# Patient Record
Sex: FEMALE | URBAN - METROPOLITAN AREA
[De-identification: names, ages, dates, MRNs, and addresses within clinical notes are randomized per-mention and may not be internally consistent; named-entity substitution may affect disease eponyms.]

---

## 2023-04-11 ENCOUNTER — OFFICE VISIT (OUTPATIENT)
Dept: URBAN - METROPOLITAN AREA CLINIC 17 | Facility: CLINIC | Age: 38
End: 2023-04-11
Payer: COMMERCIAL

## 2023-04-11 DIAGNOSIS — H04.123 DRY EYE SYNDROME OF BILATERAL LACRIMAL GLANDS: Primary | ICD-10-CM

## 2023-04-11 ASSESSMENT — INTRAOCULAR PRESSURE
OS: 13
OD: 13

## 2023-04-11 NOTE — IMPRESSION/PLAN
Impression: Dry eye syndrome of bilateral lacrimal glands: H04.123. Plan: Discussed diagnosis with patient. Recommend OTC artificial tears in OU for comfort, 3-4x's a day.  No signs of laser scars in retina OU on today's exam.

## 2023-05-19 ENCOUNTER — REFRACTIVE (OUTPATIENT)
Dept: URBAN - METROPOLITAN AREA CLINIC 37 | Facility: CLINIC | Age: 38
End: 2023-05-19

## 2023-05-19 DIAGNOSIS — H52.211 IRREGULAR ASTIGMATISM, RIGHT EYE: Primary | ICD-10-CM

## 2023-05-19 ASSESSMENT — KERATOMETRY
OS: 45.00
OD: 44.70

## 2023-05-19 ASSESSMENT — VISUAL ACUITY
OD: 20/20
OS: 20/20

## 2023-05-19 ASSESSMENT — INTRAOCULAR PRESSURE
OD: 17
OS: 18

## 2023-08-23 ENCOUNTER — OFFICE VISIT (OUTPATIENT)
Dept: URBAN - METROPOLITAN AREA CLINIC 17 | Facility: CLINIC | Age: 38
End: 2023-08-23
Payer: COMMERCIAL

## 2023-08-23 DIAGNOSIS — H52.223 REGULAR ASTIGMATISM, BILATERAL: Primary | ICD-10-CM

## 2023-08-23 DIAGNOSIS — H04.123 DRY EYE SYNDROME OF BILATERAL LACRIMAL GLANDS: ICD-10-CM

## 2023-08-23 DIAGNOSIS — H10.45 OTHER CHRONIC ALLERGIC CONJUNCTIVITIS: ICD-10-CM

## 2023-08-23 PROCEDURE — 92310 CONTACT LENS FITTING OU: CPT

## 2023-08-23 PROCEDURE — 92012 INTRM OPH EXAM EST PATIENT: CPT

## 2023-08-23 ASSESSMENT — VISUAL ACUITY
OS: 20/20
OD: 20/15

## 2023-08-23 ASSESSMENT — KERATOMETRY
OS: 46.00
OD: 44.75

## 2023-08-23 ASSESSMENT — INTRAOCULAR PRESSURE
OD: 17
OS: 15

## 2023-10-13 ENCOUNTER — OFFICE VISIT (OUTPATIENT)
Dept: URBAN - METROPOLITAN AREA CLINIC 17 | Facility: CLINIC | Age: 38
End: 2023-10-13

## 2023-10-13 DIAGNOSIS — H52.223 REGULAR ASTIGMATISM, BILATERAL: Primary | ICD-10-CM

## 2023-10-13 PROCEDURE — 92310 CONTACT LENS FITTING OU: CPT

## 2023-11-01 ENCOUNTER — OFFICE VISIT (OUTPATIENT)
Dept: URBAN - METROPOLITAN AREA CLINIC 17 | Facility: CLINIC | Age: 38
End: 2023-11-01

## 2023-11-01 DIAGNOSIS — H52.223 REGULAR ASTIGMATISM, BILATERAL: Primary | ICD-10-CM

## 2023-11-01 PROCEDURE — 92310 CONTACT LENS FITTING OU: CPT

## 2023-11-01 ASSESSMENT — INTRAOCULAR PRESSURE
OD: 16
OS: 18